# Patient Record
Sex: MALE | Race: WHITE
[De-identification: names, ages, dates, MRNs, and addresses within clinical notes are randomized per-mention and may not be internally consistent; named-entity substitution may affect disease eponyms.]

---

## 2018-12-28 ENCOUNTER — HOSPITAL ENCOUNTER (OUTPATIENT)
Dept: HOSPITAL 61 - KCIC US | Age: 33
Discharge: HOME | End: 2018-12-28
Attending: FAMILY MEDICINE
Payer: SELF-PAY

## 2018-12-28 DIAGNOSIS — R22.41: Primary | ICD-10-CM

## 2018-12-28 DIAGNOSIS — M79.604: ICD-10-CM

## 2018-12-28 PROCEDURE — 93971 EXTREMITY STUDY: CPT

## 2018-12-28 NOTE — [facility]
Right lower extremity venous duplex study 12/28/2018 2:17 PM

 

Clinical History:  Right lower extremity pain and edema

 

Technique: Using a combination of real time ultrasound imaging and 

color-flow and pulse Doppler imaging techniques, including spectral 

analysis, graded compression and augmentation, duplex evaluation of the 

deep venous system of the right lower extremity was performed. Multiple 

images were obtained.

 

Findings: There is no sonographic evidence of deep venous thrombosis 

involving the visualized deep venous structures of the  right  lower 

extremity

 

Impression: No evidence of deep venous thrombosis involving the right 

lower extremity

 

Electronically signed by: Ishmael Jade MD (12/28/2018 2:17 PM) [id number]